# Patient Record
Sex: FEMALE | Race: WHITE
[De-identification: names, ages, dates, MRNs, and addresses within clinical notes are randomized per-mention and may not be internally consistent; named-entity substitution may affect disease eponyms.]

---

## 2020-03-22 ENCOUNTER — HOSPITAL ENCOUNTER (EMERGENCY)
Dept: HOSPITAL 46 - ED | Age: 45
Discharge: HOME | End: 2020-03-22
Payer: SELF-PAY

## 2020-03-22 VITALS — HEIGHT: 72 IN | WEIGHT: 259.99 LBS | BODY MASS INDEX: 35.21 KG/M2

## 2020-03-22 DIAGNOSIS — I10: ICD-10-CM

## 2020-03-22 DIAGNOSIS — J98.9: Primary | ICD-10-CM

## 2020-03-22 DIAGNOSIS — F17.200: ICD-10-CM

## 2022-10-16 ENCOUNTER — HOSPITAL ENCOUNTER (EMERGENCY)
Dept: HOSPITAL 46 - ED | Age: 47
LOS: 1 days | Discharge: ANOTHER HOSPITAL | End: 2022-10-17
Payer: SELF-PAY

## 2022-10-16 VITALS — WEIGHT: 259.99 LBS | HEIGHT: 72 IN | BODY MASS INDEX: 35.21 KG/M2

## 2022-10-16 DIAGNOSIS — I10: ICD-10-CM

## 2022-10-16 DIAGNOSIS — I30.0: Primary | ICD-10-CM

## 2022-10-16 DIAGNOSIS — F17.200: ICD-10-CM

## 2022-10-16 PROCEDURE — U0003 INFECTIOUS AGENT DETECTION BY NUCLEIC ACID (DNA OR RNA); SEVERE ACUTE RESPIRATORY SYNDROME CORONAVIRUS 2 (SARS-COV-2) (CORONAVIRUS DISEASE [COVID-19]), AMPLIFIED PROBE TECHNIQUE, MAKING USE OF HIGH THROUGHPUT TECHNOLOGIES AS DESCRIBED BY CMS-2020-01-R: HCPCS

## 2022-10-16 PROCEDURE — C9803 HOPD COVID-19 SPEC COLLECT: HCPCS

## 2022-10-17 NOTE — EKG
[facility]
                                    [address]
                                  [place]  [address]
_________________________________________________________________________________________
                                                                 Signed   
 
 
Normal sinus rhythm
Prolonged QT
Abnormal ECG
When compared with ECG of 16-OCT-2022 21:34, (Unconfirmed)
No significant change was found
Confirmed by RINA STREETER MD ([id number]) on 10/17/2022 1:11:26 PM
 
 
 
 
 
 
 
 
 
 
 
 
 
 
 
 
 
 
 
 
 
 
 
 
 
 
 
 
 
 
 
 
 
 
 
 
 
 
 
    Electronically Signed By: RINA STREETER MD  10/17/22 1311
_________________________________________________________________________________________
PATIENT NAME:     DEANCHAR                 
MEDICAL RECORD #: [id number]                     Electrocardiogram             
          ACCT #: [id number]  
DATE OF BIRTH:   [birth year]                                       
PHYSICIAN:   RINA STREETER MD                    REPORT #: [id number]
REPORT IS CONFIDENTIAL AND NOT TO BE RELEASED WITHOUT AUTHORIZATION

## 2022-10-17 NOTE — EKG
[facility]
                                    [address]
                                  [place]  [address]
_________________________________________________________________________________________
                                                                 Signed   
 
 
Normal sinus rhythm
Prolonged QT
Abnormal ECG
No previous ECGs available
Confirmed by RINA STREETER MD ([id number]) on 10/17/2022 1:11:22 PM
 
 
 
 
 
 
 
 
 
 
 
 
 
 
 
 
 
 
 
 
 
 
 
 
 
 
 
 
 
 
 
 
 
 
 
 
 
 
 
 
    Electronically Signed By: RINA STREETER MD  10/17/22 1311
_________________________________________________________________________________________
PATIENT NAME:     CHAR MORAN                 
MEDICAL RECORD #: [id number]                     Electrocardiogram             
          ACCT #: [id number]  
DATE OF BIRTH:   [birth year]                                       
PHYSICIAN:   RINA STREETER MD                    REPORT #: [id number]
REPORT IS CONFIDENTIAL AND NOT TO BE RELEASED WITHOUT AUTHORIZATION